# Patient Record
Sex: MALE | Race: WHITE
[De-identification: names, ages, dates, MRNs, and addresses within clinical notes are randomized per-mention and may not be internally consistent; named-entity substitution may affect disease eponyms.]

---

## 2017-01-28 ENCOUNTER — HOSPITAL ENCOUNTER (EMERGENCY)
Dept: HOSPITAL 25 - ED | Age: 72
Discharge: HOME | End: 2017-01-28
Payer: MEDICARE

## 2017-01-28 VITALS — DIASTOLIC BLOOD PRESSURE: 53 MMHG | SYSTOLIC BLOOD PRESSURE: 103 MMHG
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
## 2017-01-28 DIAGNOSIS — S39.012A: Primary | ICD-10-CM
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
## 2017-01-28 PROCEDURE — 99281 EMR DPT VST MAYX REQ PHY/QHP: CPT
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
## 2018-06-11 NOTE — RAD
HISTORY: CP, chest pain



COMPARISONS: October 30, 2017



VIEWS: 1: frontal portable view of the chest at 1:56 AM



FINDINGS:

LINES AND TUBES: None.

CARDIOMEDIASTINAL SILHOUETTE: The cardiomediastinal silhouette is normal for portable

technique.

PLEURA: The costophrenic angles are sharp. No pleural abnormalities are noted.

LUNG PARENCHYMA: The lungs are clear.

ABDOMEN: The upper abdomen is clear. There is no subphrenic gas.

BONES AND SOFT TISSUES: No bone or soft tissue abnormalities are noted.



IMPRESSION: NO ACTIVE CARDIOPULMONARY DISEASE.
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
## 2018-06-11 NOTE — HP
CC:  Shabana Branch MD *

 

HISTORY AND PHYSICAL:

 

DATE OF ADMISSION:  18

 

TIME OF EVALUATION:  0300.

 

PRIMARY CARE PHYSICIAN:  Shabana Branch MD

 

CHIEF COMPLAINT:  Chest tightness and fatigue.

 

HISTORY OF PRESENT ILLNESS:  This is a 73-year-old male with a past medical 
history of coronary artery disease, status post stenting; AFib, on 
anticoagulation, status post ablation, who presents to the emergency room with 
having fatigue and chest tightness over the past few days.  He denies there 
being chest pain; however, he was concerned about the fatigue and the chest 
tightness and they are planning on [place] out to go [place] 
this morning, that he figured he should to go to the emergency room for further 
evaluation.  He states he did have some associated shortness of breath and felt 
clammy.  No nausea.  He did have diarrhea few days that has since resolved.  No 
fevers, no chills, no cold symptoms.  No abdominal pain.  No urinary symptoms.  
No leg swelling.  No recent changes in his medications.  He has had a slight 
weight gain about 5 pounds.  He has increase in his exertional activity working 
around the house, doing yard work and composting. He has also increased his 
caffeine intake.  No evidence of snoring at night.

 

In the emergency room, the patient had labs and imaging.  He was noted to be in 
rapid atrial fibrillation.  He was given metoprolol 10 mg total, a liter of 
fluid, and referred to the hospitalist service for further evaluation.  
Otherwise, review of systems is negative.

 

PAST MEDICAL HISTORY:

1.  History of coronary artery disease, status post 3 stents, STEMI in 2017, that showed in-stent restenosis of the LAD.

2.  Atrial fibrillation, history of ablation, .  He is followed by Dr. Vo.

3.  Hypertension.

4.  Hyperlipidemia.

5.  Systolic heart failure with ejection fraction of 30% to 35%.

6.  Seasonal allergies.

 

MEDICATIONS:

1.  Eliquis 5 mg p.o. b.i.d.

2.  Valsartan 20 mg p.o. daily.

3.  Lipitor 80 mg p.o. daily.

4.  Nitroglycerin 0.4 mg sublingual q.5 minutes.

5.  Metoprolol succinate 25 mg p.o. at bedtime.

6.  Loratadine 10 mg daily as needed.

7.  Brilinta 90 mg p.o. b.i.d.

8.  Viagra 100 mg p.o. once as needed.

 

ALLERGIES:  TYLENOL, OXYCODONE, develops a rash.

 

FAMILY HISTORY:  His brother had an MI in his 50s.  He passed away in his 70s.  
His paternal grandfather  in his 50s from an MI.

 

SOCIAL HISTORY:  The patient lives at home with his wife, Georgiana, who is his 
healthcare proxy.  He is a nonsmoker.  Rare alcohol use.  He has Ph.D.  Code 
status is full code.

 

REVIEW OF SYSTEMS:  A 14-point review of systems as mentioned in the HPI, 
otherwise negative.

 

                               PHYSICAL EXAMINATION

 

GENERAL:  In no acute distress, resting comfortably with his wife at the 
bedside.

 

VITAL SIGNS:  T-max 98.7, pulse rate 94, respiratory rate 16, oxygen saturation 
97% on room air, blood pressure 

106/78.

 

HEENT:  Head:  Normocephalic.  Pupils are equal and reactive, anicteric. 
Oropharynx:  Mucous membranes are moist.

 

NECK:  Supple.  No lymphadenopathy.

 

RESPIRATORY:  Clear to auscultation.  No wheezes, rhonchi, or rales.

 

CARDIAC:  Irregularly irregular rate and rhythm, tachycardia.  Systolic murmur 
most prominent at the right sternal base.

 

ABDOMEN:  Soft, nontender, nondistended.

 

EXTREMITIES:  No clubbing, cyanosis, or edema.  +2 DPs.

 

NEUROLOGICAL:  Alert and oriented x3.  No gross focal neurologic deficits.

 

 DIAGNOSTIC STUDIES/LAB DATA:  White count 7.5, hemoglobin 14.2, hematocrit 42, 
platelets 202.  INR 0.96.  Sodium 139, potassium 4.2, chloride 109, bicarb 24, 
BUN 16, creatinine 0.72, glucose 100, magnesium 1.7.  Troponin 0.02.  BNP is 
405.  TSH is 5.23.

 

Radiographic data:  EKG:  Atrial fibrillation with a rate of 131.

 

Chest x-ray:  Wet read unremarkable.

 

ASSESSMENT AND PLAN:  This is a 73-year-old male with a past medical history of 
atrial fibrillation, on anticoagulation, and coronary artery disease, status 
post stenting with a ST-segment elevation myocardial infarction, presents to 
the emergency room with fatigue and chest tightness, found to be in rapid 
atrial fibrillation.

 

Rapid atrial fibrillation.  

Assessment:  It is unclear the etiology.  He does have some chest tightness.  
His initial troponins were unremarkable.  He does have a low magnesium.  Plan:  
We will admit him to telemetry.  We will give him a dose of digoxin now and 
magnesium now.  We will place him on some gentle IV fluids.  We will keep him 
n.p.o. and have Cardiology seen him in the morning for possible cardioversion 
and further evaluation.  We will continue to trend his troponin, check a lipid 
panel.  We will continue his Eliquis. Will continue IV Lopressor, hold PO 
metoprolol  We will continue his Brilinta.  He does not want to take an aspirin 
as this caused epistaxis in the past.  We will hold his valsartan for now while 
trying to get his rate under better control.

 

FEN:  As mentioned, keep the patient n.p.o.  IV fluids placed.

 

DVT prophylaxis:  The patient scores moderate risk.  He is on Eliquis.

 

Code status:  Full code.

 

PATIENT TIME:  Greater than 45 minutes was spent doing the history and physical
, more than half the time spent in direct patient contact.

 

 

 

[id number]/CPS #: [id number]

SHANKAR

## 2018-06-11 NOTE — CONS
CC:  Dr. Branch; Dr. Vo at [facility].*

 

CARDIOLOGY CONSULTATION:

 

DATE OF CONSULT:  06/11/18

 

INDICATION FOR CONSULTATION:  Atrial fibrillation.

 

HISTORY OF PRESENT ILLNESS:  The patient is a 73-year-old gentleman with a 
history of coronary artery disease, history of atrial fibrillation, was 
admitted to the hospital with atrial fibrillation.  The patient last admitted 
to the hospital was in 10/31/17, at that time he had acute ST-segment elevation 
in the anterior leads. His cardiac catheterization showed focal restenosis of 
his LAD stent and underwent a repeat stenting of his LAD.

 

The patient states that over the last couple of days, he has been feeling more 
fatigued.  He felt short of breath yesterday and ultimately, started having 
some mild pressure in his chest.  The patient came to the emergency room.  On 
arrival to the emergency room, he was in atrial fibrillation with rapid 
ventricular response. The patient was given IV metoprolol and diltiazem, his 
heart rate came under control. He was admitted to the hospital. The patient's 
troponin has been negative x2.  His TSH was normal.

 

The patient does have a history of atrial fibrillation.  He did undergo 
pulmonary vein ablation in the past, it was 3 or 4 years ago.  He cannot 
remember the exact date and has had no episodes of atrial fibrillation since 
then.

 

PAST MEDICAL HISTORY:  Significant for coronary artery disease.  He had a stent 
placed to his LAD in October of 2016, again he had restenosis in October of 2017. History of atrial fibrillation, he is status post ablation, hyperlipidemia
, hypertension.

 

OUTPATIENT MEDICATIONS:

1.  Eliquis 5 mg b.i.d.

2.  Valsartan 20 mg a day.

3.  Lipitor 80 mg a day.

4.  Metoprolol succinate 25 mg q.h.s.

5.  Loratadine 10 mg a day.

6.  Brilinta 90 mg b.i.d.

7.  Viagra as needed.

 

ALLERGIES:  TYLENOL and OXYCODONE.

 

FAMILY HISTORY:  His father has a history of myocardial infarction in his 50s, 
his paternal grandfather had a myocardial infarction in his 50s.

 

SOCIAL HISTORY:  He lives with his wife.  He is a nonsmoker, rare alcohol 
intake.

 

PHYSICAL EXAMINATION:  Height is 5 feet 11 inches, weight is 200 pounds. 
Temperature 98.7, blood pressure 106/78, respiratory rate is 16, heart rate is 
92, oxygen saturation 97% on room air.  Sclerae anicteric.  Oropharynx is pink 
without erythema.  Carotids are 2+ without bruits.  JVD is normal.  Thyroid is 
normal. Cardiac Exam:  S1, S2 without any murmurs, rubs or gallops.  His heart 
rate is irregular.  Lungs are clear to auscultation bilaterally.  There is no 
dullness to percussion.  Abdomen is soft, nontender, nondistended with 
normoactive bowel sounds.  Extremities show no edema.  He has 2+ pulses 
throughout.  The patient is awake, alert, and oriented.  He moves all 4 
extremities equally.

 

DIAGNOSTIC STUDIES/LAB DATA:  EKG shows atrial fibrillation. Telemetry shows 
occasional 4-beat runs of idioventricular rhythm at 70 beats per minute. 
Laboratory studies are within normal limits both chemistries and CBC.

 

IMPRESSION:  This is a 73-year-old gentleman with a history of coronary artery 
disease, history of paroxysmal atrial fibrillation, is admitted to the hospital 
with atrial fibrillation.  He is ruled out for a myocardial infarction.  He has 
been on Eliquis and Brilinta for quite some time.

 

It is my recommendation, the patient undergo a cardioversion, this is his first 
episode of atrial fibrillation after his ablation.  My recommendation is just 
do a cardioversion and see how he proceeds from there.

 

 [id number]/CPS #: [id number]

[facility] CenterALESSANDRA

## 2018-06-11 NOTE — CARD
CC:  Dr. Branch; Dr. Vo at [facility].*

 

CARDIOVERSION NOTE:

 

DATE OF STUDY:  06/11/18 - ROOM #443

 

PROCEDURE:  Cardioversion.

 

INDICATION:  Atrial fibrillation.

 

The patient is a 73-year-old gentleman with a history of paroxysmal atrial 
fibrillation, history of aFib ablation a number of years ago.  This was his 
first episode of atrial fibrillation since his ablation.  The patient is on 
chronic anticoagulation.  Cardioversion was recommended.

 

DESCRIPTION OF PROCEDURE:  The patient was in a fasting state.  Informed 
consent had been obtained prior to the procedure.  All labs had been reviewed.  
The patient was given 4 mg of Versed and 50 mcg of fentanyl for conscious 
sedation.  The patient was cardioverted with a 150 joules of synchronized 
biphasic energy.  The patient converted to normal sinus rhythm.  The patient 
tolerated the procedure well without complications.

 

For now, I am not going to change any of his medications.  This is his first 
episode of atrial fibrillation since his ablation.  The patient will follow up 
with Dr. Vo as an outpatient.

 

 [id number] #: [id number]

SHANKAR

## 2018-08-15 NOTE — OP
OPERATIVE NOTE:

 

DATE OF OPERATION:  08/15/18 - [facility]

 

DATE OF BIRTH:  [birth year]

 

SURGEON:  Darryl Napier M.D.

 

PREOPERATIVE DIAGNOSIS:  Cataract right eye.

 

POSTOPERATIVE DIAGNOSIS:  Cataract right eye.

 

OPERATIVE PROCEDURE:  Extracapsular cataract extraction with intraocular lens 
implant right eye.

 

PROCEDURE:  The patient was brought to the operating room after being given 1/2
% Alcaine with epinephrine drops in the preoperative area.  The eye was prepped 
and draped in the usual sterile fashion.  Sterile drape and eyelid speculum 
were placed.  Again, topical 1/2% Alcaine with epinephrine was given.  A 
paracentesis incision was made at the 9 o'clock position with the No. 75 blade.
  Clear cornea incision 2.2 x 2.2-mm was created at the 12 o'clock position 
starting at the anterior limbus using the 2.2-mm keratome.  The anterior 
chamber was irrigated with 0.4 mL of 1% non-preservative intracameral lidocaine 
and filled with DisCoVisc.  A capsulorrhexis was completed using the cystotome 
and the Utrata forceps. Hydrodissection was performed with balanced salt 
solution.  The lens nucleus was removed with the Phacoemulsification handpiece 
without incident.  Cortex was removed with the irrigation-aspiration handpiece.
  The capsular bag was re-inflated using DisCoVisc and an [id number] 21.5 implant 
was inserted with the shooter.  The irrigation-aspiration handpiece was used to 
remove all residual DisCoVisc.  The eye was refilled with balanced salt 
solution and the wound checked and found to be watertight.  Topical Maxitrol 
drops were given.

 

 [id number]/CPS #: [id number]

MTDD

## 2018-08-22 NOTE — OP
DATE OF OPERATION:  08/22/2018 - [facility]

 

YOB: 1945.

 

SURGEON:  Darryl Napier M.D.

 

PREOPERATIVE DIAGNOSIS:  Cataract left eye.

 

POSTOPERATIVE DIAGNOSIS:  Cataract left eye.

 

OPERATIVE PROCEDURE:  Extracapsular cataract extraction with intraocular lens 
implant left eye.

 

DESCRIPTION OF PROCEDURE:  The patient was brought to the operating room after 
being given 1/2% Alcaine with epinephrine drops in the preoperative area.  The 
eye was prepped and draped in the usual sterile fashion.  Sterile drape and 
eyelid speculum were placed.  Again, topical 1/2% Alcaine with epinephrine was 
given.  A paracentesis incision was made at the 3 o'clock position with the 
No.75 blade.  Clear cornea incision 2.2 x 2.2-mm was created at the 6 o'clock 
position starting at the anterior limbus using the 2.2-mm keratome.  The 
anterior chamber was irrigated with 0.4 mL of 1% non-preservative intracameral 
lidocaine and filled with DisCoVisc.  A capsulorrhexis was completed using the 
cystotome and the Utrata forceps. Hydrodissection was performed with balanced 
salt solution.  The lens nucleus was removed with the Phacoemulsification 
handpiece without incident.  Cortex was removed with the irrigation-aspiration 
handpiece.  The capsular bag was re-inflated using DisCoVisc and an SN60WF 20.5 
implant was inserted with the shooter. The irrigation-aspiration handpiece was 
used to remove all residual DisCoVisc.  The eye was refilled with balanced salt 
solution and the wound checked and found to be watertight.  Topical Maxitrol 
drops were given.

 

 [id number]/CPS #: [id number]

[facility] CenterD
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
## 2019-12-01 NOTE — CONS
CC:  Dr. Chito Vo, [facility] *

 

INTERVENTIONAL CARDIOLOGY CONSULT:

 

DATE OF CONSULT:  12/01/19

 

INDICATION FOR THE CONSULT:  The patient presents for the acute ST-segment 
elevation myocardial infarction, now to address acute intervention.

 

HISTORY OF PRESENT ILLNESS:  The patient is a 74-year-old gentleman with a 
prior known history of multiple stents placed to the proximal portion of left 
anterior descending artery dating back to 2014, as well as a repeat acute ST-
segment elevation anterior wall myocardial infarction in 2017 after stopping 
his medications (he had been on them for a long enough time period) in 
preparation for orthopedic surgery.

 

He now states that since Wednesday, he has again been off his Eliquis and 
Brilinta in preparation for orthopedic surgery.  Today, at approximately 12 noon
, he started having development of severe chest discomfort with radiation to 
both arms and the back and slightly to the jaw.  He felt like this was a 
similar episode what he had in the past and as such, he eventually called the 
paramedics.  Paramedics arrived and tried to perform an EKG on the ride in, 
however, were unable to transmit one because they did not have a good enough 
quality tracing.  On arrival in the emergency room, an EKG was taken and he was 
found to have an acute ST-segment elevation anterior wall myocardial infarction 
again.  The patient received heparin therapy, Brilinta therapy in addition to 
getting some morphine and lidocaine by the emergency room physician.  STEMI 
alert was called.  On my presentation, he was still symptomatic with his chest 
and arm discomforts and back discomfort.  I examined him and reviewed his past 
medical history and his review of systems pertinent to proceeding to the 
cardiovascular laboratory.  The risks and benefits were then explained to both 
him and his wife.  He understood them and wished to proceed.

 

PAST MEDICAL HISTORY:  Coronary artery disease with stenting done in 2014 and 
2017; intermittent atrial fibrillation in the past with a history of ablation 
at [facility] with recurrent atrial fibrillation, now on 
anticoagulation; hyperlipidemia; history of kidney stones in the remote past; 
history of osteoarthritis; history of cataracts bilaterally.

 

REVIEW OF SYSTEMS:  Pertinent proceeding to the cardiovascular laboratory, the 
patient had no known history of significant renal insufficiency or disease.  He 
has no history of hematemesis, hematochezia, or hematuria.  He has no known 
contrast allergy and he has no history of TIAs or strokes.

 

PHYSICAL EXAM:  When I saw him in the emergency room, blood pressure 103/64 
with a pulse of 62, respirations 13, O2 saturation 95%.  Neck was supple.  
There was no increased JVP.  Lungs were clear anteriorly and laterally with no 
active rales, rhonchi, or wheezes.  Heart revealed no visible heaves, no 
palpable heaves, or thrills.  Normal S1 and S2.  There were no significant 
systolic or diastolic murmurs appreciated.  Abdomen was soft, nontender.  
Extremities were without edema. Peripheral pulses were intact.  Femoral pulses 
noted without bruits.  Neuro:  The patient is alert and oriented with normal 
mentation.  Musculoskeletal:  The patient moves all extremities appropriately.  
Psychological:  The patient with appropriate affect for situation.

 

DIAGNOSTIC STUDIES/LAB DATA:  Pending at the time of the evaluation in the 
emergency room.

 

EKG #1 dated 12/01/19, time 1531 showed normal sinus rhythm, heart rate 64.  ST
- segment elevation was seen in V2 through V5 and subtly in I, aVL, and V6 with 
reciprocal changes noted.  Repeat EKG done at 1604 showed more prominent ST-
segment changes.

 

OVERALL ASSESSMENT:  Vito presents again in the throes of acute ST-segment 
elevation anterior wall myocardial infarction similar to the exact type of 
episode that occurred when he stopped his medications 2 years ago.  He has 
already been loaded with Brilinta.  He got heparin therapy and aspirin therapy, 
and at this point in time, the risks and benefits were explained to both him 
and his wife, he understood them and wished to proceed.  Further management 
will be pending results of the cardiac catheterization.

 

 [id number]/CPS #: [id number]

MTDD

## 2019-12-02 NOTE — CATH
CC:  Dr. Chito Vo, Cardiologist at [facility] on [facility] in 
[place] *

CC:  Dr. Mehrdad Mooney, [facility], [facility] *

 

CARDIAC CATHETERIZATION AND INTERVENTIONAL REPORT:

 

DATE OF PROCEDURE:  12/01/19 - [facility] CATH

 

INDICATION FOR THE PROCEDURE:  The patient with a history of multiple stents to 
his proximal LAD, now presents with an acute ST segment elevation, anterior 
wall myocardial infarction after having stopped Brilinta and Eliquis 4 days ago 
in preparation for orthopedic surgery.

 

PROCEDURES:

1.  Coronary arteriography.

2.  Balloon angioplasty of the proximal LAD.

3.  Unsuccessful attempt to deliver stent to the proximal LAD.

4.  Placement of an intraaortic balloon pump with subsequent transfer to 
[facility] for potential open heart surgery.

 

CONSENT:  The patient was interviewed and examined in the emergency room where 
the risks and benefits were explained to him and his wife, he understood them 
and wished to proceed.

 

APPROACH UTILIZED:  The right radial artery was assessed by ultrasound and 
found to be large enough for an approach and as such this was the approach 
utilized.

 

LABORATORY RESULTS:  No laboratory results were available precardiac 
catheterization due to his emergent situation.

 

EQUIPMENT UTILIZED:

1.  Right radial sheath was a 6-French Glidesheath Slender.

2.  Diagnostic catheter was a TIG4 curved 5-French catheter for the right 
coronary artery.

3.  The left coronary artery was cannulated utilizing the 6-French LBU 3.5 
curved guide catheter.

4.  Interventional wires utilized included a regular length BMW wire, a regular 
length All Star wire.

5.  The thrombectomy device utilized was a Pronto V4 extraction catheter.

6.  Balloon angioplasty catheter utilized was a 2.5 x 15 mm Emerge balloon.

7.  Other balloon catheters utilized included a 1.2 x 8 mm long Emerge balloon.

8.  Stent attempted was a 2.7 x 16 mm long Synergy drug-eluting stent.

9.  A snare utilized was a EN Snare 175 cm long by Preact.

10.  The intraaortic balloon utilized was a 40 cm length intraaortic balloon 
pump.

 

MEDICATIONS:  Medications given during the procedure included the radial artery 
cocktail including 300 mcg of nitroglycerin, 3 mg verapamil.  Of note, no 
heparin was given as 5000 units was given in the emergency room in addition to 
180 mg of Ticagrelor.  The patient also received local Xylocaine and Versed and 
oxygen therapy.  Angiomax bolus and Angiomax drip was utilized once ACT was 
found to be subtherapeutic.  A potassium drip of 10 mEq per hour was instituted.

 

DESCRIPTION OF PROCEDURE:  The patient was brought to the cardiovascular 
laboratory where a formal time-out was performed.  He was prepped and draped in 
sterile fashion and under ultrasound guidance, the right radial artery was 
cannulated and a sheath was placed.  Diagnostic coronary arteriography was 
carried out for the right coronary artery.  The TIG4 catheter was exchanged for 
the guiding catheter and left coronary arteriography was formed.  Decision was 
made to intervene into the totally occluded LAD at short distance after its 
origin.  ACT was checked and found to be subtherapeutic, as such Angiomax bolus 
and Angiomax drip was started.  A BMW wire was advanced across the totally 
occluded proximal segment, which included through multiple stents into the 
distal LAD area.  Attempts were made to utilize the Pronto extraction catheter, 
but these were unsuccessful.  As such, balloon angioplasty was then performed 
utilizing a 2.5 x 15 mm long Emerge balloon.  Following this, a stent was 
brought up into the LAD and positioned, but on positioning it was noted that 
the first diagonal branch appeared to have RUI 1 flow and it is suggesting now 
developing compromise to this vessel.  The decision was made to try to remove 
the stent in order to then place a guidewire into the diagonal branch.  The 
stent was pulled back towards the guide and the guide catheter engaged into the 
left main and on trying to move the stent further, the stent came off of the 
balloon catheter.

   

At this point in time, multiple attempts were made to remove the stent 
including placing a 1.5 mm balloon through the stent, distal to the stent, 
inflating it and pulling it back.  This was unsuccessful.  Following this, 
attempts were made utilizing 2 guidewires placed distally and twisting them and 
trying to remove it. After this was unsuccessful as well, attempts were then 
made to snare the stent and remove it.  This was also unsuccessful. Of note, 
attempts were made to deliver a 2.5 x 15 mm balloon over a wire that was placed 
distally to the undeployed stent across the LAD to further dilate the LAD lesion
, but the balloon angioplasty catheter could not get past this stented area. It 
was noted after aggressive attempts were made to remove the stent with deep 
seating of the guide catheter, there appeared to be staining in the wall of the 
distal left main into the LAD.  Of note, there was no compromise of the blood 
flow in the left main into the LAD or circumflex.  Given these findings, the 
decision was made to stop at this point in time and discuss transferring the 
patient for the potential for open heart surgery. An intraaortic balloon pump 
was placed given the presence of a significant LAD lesion was still present 
after PTCA but flow was restored.   A discussion was then undertaken with the 
[facility] at [place], [place] as the patient initially wished to be 
transferred there since he is an established patient of the [facility].  I 
had spoken to the on call surgeon for Cardiothoracic Surgery and was initially 
led to believe that the transfer was going to take place.  Within 15 to 30 
minutes, however, I received a phone call from the on call surgeon stating that 
he had spoken with surgeon who would ultimately be performing the surgery,  as 
well as the on call cardiologist, and told me they felt within their best 
interest to refuse the transfer.  At that point in time, I made an urgent phone 
call up to Dr. Mehrdad Mooney at [facility] who immediately 
graciously accepted the patient in transfer to be admitted to the CTICU. 
Following this, we eventually removed the remaining guidewire and checked RUI 
flow through the LAD system .  There appeared to be RUI 3 flow in both the 
proximal to mid LAD and the diagonal, with rui 2 flow to the distal LAD apical 
area.  Of note, at the time of transfer, the patient denied any active chest 
discomfort, no shortness of breath, no nausea, vomiting or diaphoresis.  He was 
on an Angiomax drip.  The right radial artery sheath was kept in place.  I 
discussed several times with Dr. Mooney the results of the catheterization 
films and offered them to be e-mailed to him, but he wanted me just to make 
sure they were sent to the system at [facility] and we 
transferred those images up twice to make sure they were there.  The patient 
was transported with a cath lab nurse as well for help with management of the 
balloon pump and patient in general.

 

RESULTS:

 

CORONARY ARTERIOGRAPHY: 

   A.  Right coronary artery - a dominant vessel supplying multiple acute 
marginal branches and a PDA and posterior left ventricular branches, there were 
mild luminal irregularities, but no significant obstruction seen.  There 
appeared to be collateral flow in the septal perforators going up to but not 
filling the LAD to any significant degree.

   B.  Left coronary artery:

      1.  Left main - initially widely patent with no significant disease.  Of 
note, after aggressive attempts at stent removal, there appeared to be staining 
in the wall of the distal left main into the LAD without significant compromise 
within the left main itself.

      2.  Left anterior descending artery - the left anterior descending artery 
is totally occluded a short distance after its ostium.  On reopening the artery
, the LAD was noted to have a critical lesion in the proximal to mid segment 
for which balloon angioplasty was performed.  The diagonal branch also was 
noted to have a significant lesion at its ostium as well as at its proximal-to-
mid segment.  The rest of the LAD supplied out to the apical region supplying 
another small caliber mid diagonal branch.  Of note, the flow at the apical 
region was RUI 2 flow at the very apex.

      3.  Circumflex artery - a nondominant vessel supplying a thin high first 
obtuse marginal branch, which had  a 75% to 80% lesion seen.  Of note, this 
vessel was too small for any type of intervention or bypass.  Following this 
was a small caliber mid short obtuse marginal branch followed by a large 
bifurcating obtuse marginal branch, which had mild narrowing in its proximal 
portion of 35%.

 

INTERVENTION TO LAD:

   - balloon angioplasty to the proximal portion of the LAD with restoration of 
flow, albeit RUI 2 in the apical region. Unsuccessful deliver of the stent as 
mentioned above due to it sliding off of the balloon catheter and our inability 
to retrieve it despite multiple attempts utilizing different techniques as 
described above. A significant residual lesion, hazy in nature was present in 
the proximal LAD.

 

OVERALL ASSESSMENT:  

   Unsuccessful attempt to reestablish unobstructed flow through the LAD system 
with loss of the stent off the balloon catheter with inability to retrieve as 
described above.  An intraaortic balloon was placed to stabilize the patient 
who had no active chest discomfort and was hemodynamically stable at the time 
of transfer to [facility].  Angiomax was continued so as to 
avoid thrombosis within the proximal LAD.  Further management will be under the 
guidance of the [facility] physicians.  Of note, I did have 
extensive discussion with Dr. Mooney on multiple times and did recommend that 
an EKG be done on arrival to make sure the patient is stable as well.

 

 [id number]/[facility] #: [id number]

Catskill Regional Medical CenterALESSANDRA